# Patient Record
Sex: MALE | Race: WHITE | NOT HISPANIC OR LATINO | URBAN - NONMETROPOLITAN AREA
[De-identification: names, ages, dates, MRNs, and addresses within clinical notes are randomized per-mention and may not be internally consistent; named-entity substitution may affect disease eponyms.]

---

## 2017-02-21 PROBLEM — H35.3131: Noted: 2017-02-21

## 2017-02-21 PROBLEM — H16.422: Noted: 2017-02-21

## 2017-02-21 PROBLEM — H16.142: Noted: 2017-02-21

## 2017-02-21 PROBLEM — H25.13: Noted: 2017-02-21

## 2017-02-21 PROBLEM — H04.123: Noted: 2017-02-21

## 2017-02-21 PROBLEM — H35.433: Noted: 2017-02-21

## 2019-03-12 ENCOUNTER — IMPORTED ENCOUNTER (OUTPATIENT)
Dept: URBAN - NONMETROPOLITAN AREA CLINIC 1 | Facility: CLINIC | Age: 69
End: 2019-03-12

## 2019-03-12 PROCEDURE — 92250 FUNDUS PHOTOGRAPHY W/I&R: CPT

## 2019-03-12 PROCEDURE — 92014 COMPRE OPH EXAM EST PT 1/>: CPT

## 2019-03-12 NOTE — PATIENT DISCUSSION
ARMD OU: (moderate)- Discussed diagnosis in detail with patient today- OCT done previously shows drusen and Dry stable ARMD OU- PHP done previously baseline WNL- Optos done today drusen noted (OD>OS). - Continue to use [employer] daily call or come in ASAP if changes in vision are noted from today. - Continue PreserVision eye vitamins stressed importance of taking daily. - Continue to monitor every 6 months with testing.  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- Continue to use Systane or Refresh Leonid through out the day - Continue to monitorDES OU / [place] Keratitis OS - Discussed diagnosis in detail with patient today- Discussed signs and symptoms of progression- Continue Systane as directed BID more if needed- Recommend drinking plenty of water and starting [place] 3's- Continue to [place] OU- Discussed diganosis in detail w/ pt today- No progression noted at this time- Continue to monitor for progression/changes- No VA or glare complaint noted by patient at this time- BAT done previously 20/100 OU- Continue to monitorPavingstone Degeneration OU- Discussed diagnosis in detail w/ pt today- Patient is stable at this time - Continue to monitorHyperopia/Astigmatism/Presbyopia OU- Discussed diagnosis in detail with patient- MR deferred by patient today no GLRx given - Monitor yearly or PRN; 's Notes:   3/12/19 deferredDFE  3/12/19OCT  8/23/18Optos 3/12/19PHP  2/21/17

## 2019-09-17 ENCOUNTER — IMPORTED ENCOUNTER (OUTPATIENT)
Dept: URBAN - NONMETROPOLITAN AREA CLINIC 1 | Facility: CLINIC | Age: 69
End: 2019-09-17

## 2019-09-17 PROCEDURE — 92134 CPTRZ OPH DX IMG PST SGM RTA: CPT

## 2019-09-17 PROCEDURE — 92014 COMPRE OPH EXAM EST PT 1/>: CPT

## 2019-09-17 NOTE — PATIENT DISCUSSION
ARMD OU: (moderate)- Discussed diagnosis in detail with patient today- OCT done today shows drusen and Dry stable ARMD OU- PHP done previously baseline WNL- Optos done previously drusen noted (OD>OS). - Continue to use [employer] daily call or come in ASAP if changes in vision are noted from today. - Continue PreserVision eye vitamins stressed importance of taking daily. - Continue to monitor every 6 months with testing.  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- Continue to use Systane or Refresh Leonid through out the day - Continue to monitorDES OU / [place] Keratitis OS - Discussed diagnosis in detail with patient today- Discussed signs and symptoms of progression- Continue Systane as directed BID more if needed- Recommend drinking plenty of water and starting [place] 3's- Continue to [place] OU- Discussed diganosis in detail w/ pt today- No progression noted at this time- Continue to monitor for progression/changes- No VA or glare complaint noted by patient at this time- BAT done previously 20/100 OU- Continue to monitorPavingstone Degeneration OU- Discussed diagnosis in detail w/ pt today- Patient is stable at this time - Continue to monitorHyperopia/Astigmatism/Presbyopia OU- Discussed diagnosis in detail with patient- Monitor yearly or PRN; 's Notes:   3/12/19 deferredDFE  9/17/19OCT 9/17/19Optos 3/12/19PHP  2/21/17

## 2020-11-12 ENCOUNTER — IMPORTED ENCOUNTER (OUTPATIENT)
Dept: URBAN - NONMETROPOLITAN AREA CLINIC 1 | Facility: CLINIC | Age: 70
End: 2020-11-12

## 2020-11-12 PROCEDURE — 92250 FUNDUS PHOTOGRAPHY W/I&R: CPT

## 2020-11-12 PROCEDURE — 92014 COMPRE OPH EXAM EST PT 1/>: CPT

## 2020-11-12 NOTE — PATIENT DISCUSSION
ARMD OU: (moderate)- Discussed diagnosis in detail with patient today- OCT done previously shows drusen and Dry stable ARMD OU- PHP done previously baseline WNL- Optos done today drusen noted (OD>OS). - Continue to use [employer] daily call or come in ASAP if changes in vision are noted from today. - Discussed  PreserVision eye vitamins stressed importance of taking daily. Pt reports he hasnt been taking them because he has not seen a difference. Informed patient they will not cure this diagnosis but will slow the progression - Continue to monitor every 6 months with testing.  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- Continue to use Systane or Refresh Leonid through out the day - Continue to monitorDES OU / [place] Keratitis OS - Discussed diagnosis in detail with patient today- Discussed signs and symptoms of progression- Continue Systane as directed BID more if needed- Recommend drinking plenty of water and starting [place] 3's- [facility]- Discussed diganosis in detail w/ pt today- No progression noted at this time- Continue to monitor for progression/changes- No VA or glare complaint noted by patient at this time- BAT done previously 20/100 OU- Continue to monitorPavingstone Degeneration OU- Discussed diagnosis in detail w/ pt today- Patient is stable at this time - Continue to monitorHyperopia/Astigmatism/Presbyopia OU- Discussed diagnosis in detail with patient-Pt defers MR today 11/12/20- Monitor yearly or PRN; 's Notes: MR  11/12/20 deferredDFE  11/12/20OCT 9/17/19Optos 11/12/20 PHP  2/21/17

## 2021-05-13 ENCOUNTER — IMPORTED ENCOUNTER (OUTPATIENT)
Dept: URBAN - NONMETROPOLITAN AREA CLINIC 1 | Facility: CLINIC | Age: 71
End: 2021-05-13

## 2021-05-13 PROBLEM — H35.433: Noted: 2021-05-13

## 2021-05-13 PROBLEM — H04.123: Noted: 2021-05-13

## 2021-05-13 PROBLEM — H35.372: Noted: 2021-05-13

## 2021-05-13 PROBLEM — H35.3131: Noted: 2021-12-23

## 2021-05-13 PROBLEM — H25.813: Noted: 2021-05-13

## 2021-05-13 PROBLEM — H25.813: Noted: 2021-12-23

## 2021-05-13 PROBLEM — H16.422: Noted: 2021-05-13

## 2021-05-13 PROBLEM — H16.142: Noted: 2021-05-13

## 2021-05-13 PROBLEM — H35.372: Noted: 2021-12-23

## 2021-05-13 PROBLEM — H35.3131: Noted: 2021-05-13

## 2021-05-13 PROCEDURE — 99214 OFFICE O/P EST MOD 30 MIN: CPT

## 2021-05-13 PROCEDURE — 92134 CPTRZ OPH DX IMG PST SGM RTA: CPT

## 2021-05-13 NOTE — PATIENT DISCUSSION
"ARMD OU: (moderate) ERM OS - Discussed diagnosis in detail with patient today- OCT done today shows drusen OU and EEM OS - PHP done previously baseline WNL- Optos done previously drusen noted (OD>OS). - Continue to use Ams[employer] Grid daily call or come in ASAP if changes in vision are noted from today. - Discussed  PreserVision eye vitamins stressed importance of taking daily.  Pt reports he hasnt been taking them because he does not like to take medicine Informed patient they will not cure this diagnosis but will slow the progression - Patient states he see ""worms"" in his eyes when he takes the eye vitamins - Continue to monitorCataracts OU- Discussed diagnosis in detail with patient- Discussed signs and symptoms of progression- Discussed UV protection- Progression noted today - Continue to monitor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- Continue to monitorDES OU / [place] Keratitis OS - Discussed diagnosis in detail with patient today- Discussed signs and symptoms of progression- Continue Systane as directed BID more if needed- Recommend drinking plenty of water and starting Omega 3's- Continue to monitorPavingstone Degeneration OU- Discussed diagnosis in detail w/ pt today- Patient is stable at this time - Continue to monitorHyperopia/Astigmatism/Presbyopia OU- Discussed diagnosis in detail with patient- Continue to monitor; 's Notes: MR  11/12/20 deferredDFE  11/12/20OCT 5/12/21Optos 11/12/20 PHP  2/21/17"

## 2021-09-13 ENCOUNTER — IMPORTED ENCOUNTER (OUTPATIENT)
Dept: URBAN - NONMETROPOLITAN AREA CLINIC 1 | Facility: CLINIC | Age: 71
End: 2021-09-13

## 2021-09-13 PROCEDURE — 92015 DETERMINE REFRACTIVE STATE: CPT

## 2021-09-13 NOTE — PATIENT DISCUSSION
"Refraction only- Discussed diagnosis in detail with patient -  New glasses RX given - Explained to patient that his cataracts are progressing and VA is not correctable to 20/20 due to the cataracts- Continue to monitor PREVIOUS NOTES ARMD OU: (moderate) ERM OS - Discussed diagnosis in detail with patient today- OCT done today shows drusen OU and ERM OS - PHP done previously baseline WNL- Optos done previously drusen noted (OD>OS). - Continue to use [employer] daily call or come in ASAP if changes in vision are noted from today. - Discussed  PreserVision eye vitamins stressed importance of taking daily.  Pt reports he hasnt been taking them because he does not like to take medicine Informed patient they will not cure this diagnosis but will slow the progression - Patient states he see ""worms"" in his eyes when he takes the eye vitamins - Continue to monitorCataracts OU- Discussed diagnosis in detail with patient- Discussed signs and symptoms of progression- Discussed UV protection- Progression noted today - Continue to monitor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- Continue to monitorDES OU / [place] Keratitis OS - Discussed diagnosis in detail with patient today- Discussed signs and symptoms of progression- Continue Systane as directed BID more if needed- Recommend drinking plenty of water and starting Omega 3's- Continue to monitorPavingstone Degeneration OU- Discussed diagnosis in detail w/ pt today- Patient is stable at this time - Continue to monitorHyperopia/Astigmatism/Presbyopia OU- Discussed diagnosis in detail with patient- Continue to monitor; Dr's Notes: MR  11/12/20 deferredDFE  11/12/20OCT 5/12/21Optos 11/12/20 PHP  2/21/17"

## 2021-12-23 ENCOUNTER — IMPORTED ENCOUNTER (OUTPATIENT)
Dept: URBAN - NONMETROPOLITAN AREA CLINIC 1 | Facility: CLINIC | Age: 71
End: 2021-12-23

## 2021-12-23 PROCEDURE — 92015 DETERMINE REFRACTIVE STATE: CPT

## 2021-12-23 NOTE — PATIENT DISCUSSION
"Refraction only- Discussed diagnosis in detail with patient - MR re-checked today by Dr. Leeanne Mays and glasses RX given - Explained to patient that his cataracts are progressing and VA is not correctable to 20/20 due to the cataracts- Continue to monitor PREVIOUS NOTES ARMD OU: (moderate) ERM OS - Discussed diagnosis in detail with patient today- OCT done today shows drusen OU and ERM OS - PHP done previously baseline WNL- Optos done previously drusen noted (OD>OS). - Continue to use [employer] daily call or come in ASAP if changes in vision are noted from today. - Discussed  PreserVision eye vitamins stressed importance of taking daily.  Pt reports he hasnt been taking them because he does not like to take medicine Informed patient they will not cure this diagnosis but will slow the progression - Patient states he see ""worms"" in his eyes when he takes the eye vitamins - Continue to monitorCataracts OU- Discussed diagnosis in detail with patient- Discussed signs and symptoms of progression- Discussed UV protection- Progression noted today - Continue to monitor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- Continue to monitorDES OU / [place] Keratitis OS - Discussed diagnosis in detail with patient today- Discussed signs and symptoms of progression- Continue Systane as directed BID more if needed- Recommend drinking plenty of water and starting Omega 3's- Continue to monitorPavingstone Degeneration OU- Discussed diagnosis in detail w/ pt today- Patient is stable at this time - Continue to monitorHyperopia/Astigmatism/Presbyopia OU- Discussed diagnosis in detail with patient- Continue to monitor; Dr's Notes: MR  11/12/20 deferredDFE  11/12/20OCT 5/12/21Optos 11/12/20 PHP  2/21/17"

## 2022-04-10 ASSESSMENT — VISUAL ACUITY
OD_CC: 20/20-1
OS_SC: 20/20
OD_SC: 20/29-
OS_SC: 20/25
OS_CC: 20/20-1
OD_SC: 20/29
OS_SC: 20/29+
OS_SC: 20/25-2
OS_GLARE: 20/32
OD_SC: 20/20
OU_CC: 20/20
OS_GLARE: 20/60+
OD_SC: 20/20
OD_SC: 20/50
OS_SC: 20/20
OD_GLARE: 20/40+
OD_GLARE: 20/70
OD_SC: 20/20-
OS_SC: 20/20-

## 2022-04-10 ASSESSMENT — TONOMETRY
OS_IOP_MMHG: 10
OD_IOP_MMHG: 8
OD_IOP_MMHG: 09
OS_IOP_MMHG: 10
OD_IOP_MMHG: 10
OS_IOP_MMHG: 10
OD_IOP_MMHG: 10
OS_IOP_MMHG: 11